# Patient Record
Sex: FEMALE | Race: WHITE | URBAN - METROPOLITAN AREA
[De-identification: names, ages, dates, MRNs, and addresses within clinical notes are randomized per-mention and may not be internally consistent; named-entity substitution may affect disease eponyms.]

---

## 2017-08-03 ENCOUNTER — TELEPHONE (OUTPATIENT)
Dept: INTERNAL MEDICINE CLINIC | Age: 42
End: 2017-08-03

## 2019-07-01 ENCOUNTER — HOSPITAL ENCOUNTER (OUTPATIENT)
Age: 44
Setting detail: SPECIMEN
Discharge: HOME | End: 2019-07-01

## 2019-07-01 PROCEDURE — 86481 TB AG RESPONSE T-CELL SUSP: CPT

## 2019-07-01 PROCEDURE — 86735 MUMPS ANTIBODY: CPT

## 2019-07-01 PROCEDURE — 86762 RUBELLA ANTIBODY: CPT

## 2019-07-01 PROCEDURE — 86787 VARICELLA-ZOSTER ANTIBODY: CPT

## 2019-07-01 PROCEDURE — 86765 RUBEOLA ANTIBODY: CPT

## 2019-07-03 LAB
MUV IGG SER QL: 155 AU/ML
MUV IGG SER QL: NORMAL AU/ML
RUBELLA ANTIBODY IGG: 25.4 IU/ML
RUBELLA ANTIBODY IGG: NORMAL IU/ML
RUBEOLA (MEASLES) AB IGG: 16.4 AU/ML
RUBEOLA (MEASLES) AB IGG: NORMAL AU/ML
VARICELLA-ZOSTER VIRUS AB, IGG: 1446 IV
VARICELLA-ZOSTER VIRUS AB, IGG: NORMAL IV

## 2019-07-07 LAB
NIL (NEGATIVE) SPOT CONTROL: NORMAL
PANEL A SPOT COUNT: 2
PANEL B SPOT COUNT: 0
POSITIVE CONTROL SPOT COUNT: NORMAL
POSITIVE CONTROL SPOT COUNT: NORMAL
TB CELL IMMUNE MEASURE: NEGATIVE
TB CELL IMMUNE MEASURE: NORMAL

## 2021-11-17 ENCOUNTER — HOSPITAL ENCOUNTER (OUTPATIENT)
Age: 46
Setting detail: SPECIMEN
Discharge: HOME | End: 2021-11-17
Payer: COMMERCIAL

## 2021-11-17 PROCEDURE — 87624 HPV HI-RISK TYP POOLED RSLT: CPT

## 2021-11-17 PROCEDURE — 88142 CYTOPATH C/V THIN LAYER: CPT

## 2021-11-22 LAB
HPV HIGH RISK: NOT DETECTED
HPV, GENOTYPE 16: NOT DETECTED
HPV, GENOTYPE 18: NOT DETECTED

## 2022-01-26 ENCOUNTER — HOSPITAL ENCOUNTER (OUTPATIENT)
Dept: MAMMOGRAPHY | Age: 47
Discharge: HOME | End: 2022-01-26
Payer: COMMERCIAL

## 2022-01-26 DIAGNOSIS — Z12.39 SCREENING BREAST EXAMINATION: ICD-10-CM

## 2022-01-26 PROCEDURE — 77063 BREAST TOMOSYNTHESIS BI: CPT

## 2022-02-11 ENCOUNTER — HOSPITAL ENCOUNTER (OUTPATIENT)
Dept: ULTRASOUND IMAGING | Age: 47
Discharge: HOME | End: 2022-02-11
Payer: COMMERCIAL

## 2022-02-11 DIAGNOSIS — R92.8 ABNORMAL MAMMOGRAM: ICD-10-CM

## 2022-02-11 PROCEDURE — 76642 ULTRASOUND BREAST LIMITED: CPT

## 2024-01-17 ENCOUNTER — HOSPITAL ENCOUNTER (OUTPATIENT)
Dept: MAMMOGRAPHY | Age: 49
Discharge: HOME | End: 2024-01-17
Payer: COMMERCIAL

## 2024-01-17 VITALS — WEIGHT: 240 LBS | HEIGHT: 61 IN | BODY MASS INDEX: 45.31 KG/M2

## 2024-01-17 DIAGNOSIS — Z12.31 SCREENING MAMMOGRAM FOR BREAST CANCER: ICD-10-CM

## 2024-01-17 PROCEDURE — 77063 BREAST TOMOSYNTHESIS BI: CPT

## 2024-05-22 RX ORDER — MAGNESIUM GLUCONATE 27 MG(500)
500 TABLET ORAL 2 TIMES DAILY
COMMUNITY

## 2024-05-22 RX ORDER — CHOLECALCIFEROL (VITAMIN D3) 1250 MCG
CAPSULE ORAL
COMMUNITY

## 2024-05-22 RX ORDER — DOXYCYCLINE HYCLATE 50 MG/1
50 CAPSULE ORAL DAILY
COMMUNITY

## 2024-05-22 RX ORDER — NORETHINDRONE ACETATE AND ETHINYL ESTRADIOL 1MG-20(21)
1 KIT ORAL DAILY
COMMUNITY

## 2024-05-22 RX ORDER — LOSARTAN POTASSIUM 100 MG/1
100 TABLET ORAL DAILY
COMMUNITY

## 2024-05-22 NOTE — PROGRESS NOTES
.Surgery @ [facility] on 5/29/24 1200 arrival 1000    NOTHING TO EAT OR DRINK AFTER MIDNIGHT DAY OF SURGERY    1. Enter thru the hospital main entrance on day of surgery, check in at the Information Desk. If you arrive prior to 6:00am, enter thru the ER entrance.    2. Follow the directions as prescribed by the doctor for your procedure and medications.         Morning of surgery take: no medications          Stop vitamins, supplements and NSAIDS:  now     3. Check with your Doctor regarding stopping blood thinners and follow their instructions.    4. Do not smoke, vape or use chewing tobacco morning of surgery. Do not drink any alcoholic beverages 24 hours prior to surgery.       This includes NA Beer. No street drugs 7 days prior to surgery.    5. If you have dentures, contacts of glasses they will be removed before going to the OR; please bring a case.    6. Please bring picture ID, insurance card, paperwork from the doctor’s office (H & P, Consent, & card for implantable devices).    7. Take a shower with an antibacterial soap the night before surgery and the morning of surgery. Do not put anything on your skin      After your morning shower.    8. You will need a responsible adult to drive you home and check on you after surgery.

## 2024-05-28 ENCOUNTER — ANESTHESIA EVENT (OUTPATIENT)
Dept: OPERATING ROOM | Age: 49
End: 2024-05-28
Payer: COMMERCIAL

## 2024-05-28 ASSESSMENT — LIFESTYLE VARIABLES: SMOKING_STATUS: 0

## 2024-05-28 NOTE — ANESTHESIA PRE PROCEDURE
Department of Anesthesiology  Preprocedure Note       Name:  Jenna Trujillo   Age:  48 y.o.  :  1975                                          MRN:  [id number]         Date:  2024      Surgeon: Surgeon(s):  Cam Jose MD    Procedure: Procedure(s):  LEFT KNEE ARTHROSCOPY MEDIAL MENISCECTOMY WITH LOOSE BODY REMOVAL    Medications prior to admission:   Prior to Admission medications    Medication Sig Start Date End Date Taking? Authorizing Provider   Cholecalciferol (VITAMIN D3) 1.25 MG (56315 UT) CAPS Take by mouth   Yes Rogelio Ackerman MD   magnesium gluconate (MAGONATE) 500 MG tablet Take 1 tablet by mouth 2 times daily   Yes Rogelio Ackerman MD   losartan (COZAAR) 100 MG tablet Take 1 tablet by mouth daily   Yes Rogelio Ackerman MD   norethindrone-ethinyl estradiol (JUNEL FE 1/20) 1-20 MG-MCG per tablet Take 1 tablet by mouth daily   Yes Rogelio Ackerman MD   doxycycline (VIBRAMYCIN) 50 MG capsule Take 1 capsule by mouth daily   Yes Rogelio Ackerman MD   norgestimate-ethinyl estradiol (SPRINTEC 28) 0.25-35 MG-MCG per tablet Take 1 tablet by mouth daily.      Rogelio Ackerman MD   UNKNOWN TO PATIENT Take 1 tablet by mouth daily. It's a birth control pill. Pt. doesn't know the name of the pill.     ProviderRogelio MD       Current medications:    No current facility-administered medications for this encounter.     Current Outpatient Medications   Medication Sig Dispense Refill   • Cholecalciferol (VITAMIN D3) 1.25 MG (16897 UT) CAPS Take by mouth     • magnesium gluconate (MAGONATE) 500 MG tablet Take 1 tablet by mouth 2 times daily     • losartan (COZAAR) 100 MG tablet Take 1 tablet by mouth daily     • norethindrone-ethinyl estradiol (JUNEL FE 1/20) 1-20 MG-MCG per tablet Take 1 tablet by mouth daily     • doxycycline (VIBRAMYCIN) 50 MG capsule Take 1 capsule by mouth daily     • norgestimate-ethinyl estradiol (SPRINTEC 28) 0.25-35 MG-MCG per tablet Take 1

## 2024-05-29 ENCOUNTER — HOSPITAL ENCOUNTER (OUTPATIENT)
Age: 49
Setting detail: OUTPATIENT SURGERY
Discharge: HOME | End: 2024-05-29
Attending: ORTHOPAEDIC SURGERY | Admitting: ORTHOPAEDIC SURGERY
Payer: COMMERCIAL

## 2024-05-29 ENCOUNTER — ANESTHESIA (OUTPATIENT)
Dept: OPERATING ROOM | Age: 49
End: 2024-05-29
Payer: COMMERCIAL

## 2024-05-29 VITALS
SYSTOLIC BLOOD PRESSURE: 111 MMHG | TEMPERATURE: 97.8 F | HEART RATE: 69 BPM | HEIGHT: 61 IN | OXYGEN SATURATION: 97 % | RESPIRATION RATE: 18 BRPM | BODY MASS INDEX: 47.2 KG/M2 | WEIGHT: 250 LBS | DIASTOLIC BLOOD PRESSURE: 70 MMHG

## 2024-05-29 DIAGNOSIS — G89.18 ACUTE POSTOPERATIVE PAIN: Primary | ICD-10-CM

## 2024-05-29 PROBLEM — S83.232A COMPLEX TEAR OF MEDIAL MENISCUS OF LEFT KNEE AS CURRENT INJURY: Status: ACTIVE | Noted: 2024-05-29

## 2024-05-29 LAB — PREGNANCY TEST URINE, POC: NEGATIVE

## 2024-05-29 PROCEDURE — 2709999900 HC NON-CHARGEABLE SUPPLY: Performed by: ORTHOPAEDIC SURGERY

## 2024-05-29 PROCEDURE — 6360000002 HC RX W HCPCS: Performed by: REGISTERED NURSE

## 2024-05-29 PROCEDURE — 81025 URINE PREGNANCY TEST: CPT

## 2024-05-29 PROCEDURE — 3600000014 HC SURGERY LEVEL 4 ADDTL 15MIN: Performed by: ORTHOPAEDIC SURGERY

## 2024-05-29 PROCEDURE — 6360000002 HC RX W HCPCS: Performed by: ANESTHESIOLOGY

## 2024-05-29 PROCEDURE — 6360000002 HC RX W HCPCS: Performed by: ORTHOPAEDIC SURGERY

## 2024-05-29 PROCEDURE — 7100000000 HC PACU RECOVERY - FIRST 15 MIN: Performed by: ORTHOPAEDIC SURGERY

## 2024-05-29 PROCEDURE — 3700000000 HC ANESTHESIA ATTENDED CARE: Performed by: ORTHOPAEDIC SURGERY

## 2024-05-29 PROCEDURE — 7100000001 HC PACU RECOVERY - ADDTL 15 MIN: Performed by: ORTHOPAEDIC SURGERY

## 2024-05-29 PROCEDURE — 2580000003 HC RX 258: Performed by: ANESTHESIOLOGY

## 2024-05-29 PROCEDURE — 3700000001 HC ADD 15 MINUTES (ANESTHESIA): Performed by: ORTHOPAEDIC SURGERY

## 2024-05-29 PROCEDURE — 7100000011 HC PHASE II RECOVERY - ADDTL 15 MIN: Performed by: ORTHOPAEDIC SURGERY

## 2024-05-29 PROCEDURE — 3600000004 HC SURGERY LEVEL 4 BASE: Performed by: ORTHOPAEDIC SURGERY

## 2024-05-29 PROCEDURE — 7100000010 HC PHASE II RECOVERY - FIRST 15 MIN: Performed by: ORTHOPAEDIC SURGERY

## 2024-05-29 RX ORDER — CLINDAMYCIN PHOSPHATE 600 MG/50ML
600 INJECTION, SOLUTION INTRAVENOUS ONCE
Status: COMPLETED | OUTPATIENT
Start: 2024-05-29 | End: 2024-05-29

## 2024-05-29 RX ORDER — OXYCODONE HYDROCHLORIDE AND ACETAMINOPHEN 5; 325 MG/1; MG/1
1-2 TABLET ORAL EVERY 6 HOURS PRN
Qty: 20 TABLET | Refills: 0 | Status: SHIPPED | OUTPATIENT
Start: 2024-05-29 | End: 2024-06-03

## 2024-05-29 RX ORDER — PROPOFOL 10 MG/ML
INJECTION, EMULSION INTRAVENOUS PRN
Status: DISCONTINUED | OUTPATIENT
Start: 2024-05-29 | End: 2024-05-29 | Stop reason: SDUPTHER

## 2024-05-29 RX ORDER — OXYCODONE HYDROCHLORIDE AND ACETAMINOPHEN 5; 325 MG/1; MG/1
1 TABLET ORAL EVERY 6 HOURS PRN
Qty: 20 TABLET | Refills: 0 | Status: SHIPPED | OUTPATIENT
Start: 2024-05-29 | End: 2024-06-03

## 2024-05-29 RX ORDER — FENTANYL CITRATE 50 UG/ML
25 INJECTION, SOLUTION INTRAMUSCULAR; INTRAVENOUS EVERY 5 MIN PRN
Status: DISCONTINUED | OUTPATIENT
Start: 2024-05-29 | End: 2024-05-29 | Stop reason: HOSPADM

## 2024-05-29 RX ORDER — ONDANSETRON 2 MG/ML
4 INJECTION INTRAMUSCULAR; INTRAVENOUS
Status: DISCONTINUED | OUTPATIENT
Start: 2024-05-29 | End: 2024-05-29 | Stop reason: HOSPADM

## 2024-05-29 RX ORDER — TRIAMCINOLONE ACETONIDE 40 MG/ML
INJECTION, SUSPENSION INTRA-ARTICULAR; INTRAMUSCULAR
Status: COMPLETED | OUTPATIENT
Start: 2024-05-29 | End: 2024-05-29

## 2024-05-29 RX ORDER — SODIUM CHLORIDE 9 MG/ML
INJECTION, SOLUTION INTRAVENOUS PRN
Status: DISCONTINUED | OUTPATIENT
Start: 2024-05-29 | End: 2024-05-29 | Stop reason: HOSPADM

## 2024-05-29 RX ORDER — LIDOCAINE HYDROCHLORIDE 20 MG/ML
INJECTION, SOLUTION INTRAVENOUS PRN
Status: DISCONTINUED | OUTPATIENT
Start: 2024-05-29 | End: 2024-05-29 | Stop reason: SDUPTHER

## 2024-05-29 RX ORDER — MIDAZOLAM HYDROCHLORIDE 1 MG/ML
INJECTION INTRAMUSCULAR; INTRAVENOUS PRN
Status: DISCONTINUED | OUTPATIENT
Start: 2024-05-29 | End: 2024-05-29 | Stop reason: SDUPTHER

## 2024-05-29 RX ORDER — SODIUM CHLORIDE 0.9 % (FLUSH) 0.9 %
5-40 SYRINGE (ML) INJECTION PRN
Status: DISCONTINUED | OUTPATIENT
Start: 2024-05-29 | End: 2024-05-29 | Stop reason: HOSPADM

## 2024-05-29 RX ORDER — LABETALOL HYDROCHLORIDE 5 MG/ML
10 INJECTION, SOLUTION INTRAVENOUS
Status: DISCONTINUED | OUTPATIENT
Start: 2024-05-29 | End: 2024-05-29 | Stop reason: HOSPADM

## 2024-05-29 RX ORDER — SODIUM CHLORIDE 0.9 % (FLUSH) 0.9 %
5-40 SYRINGE (ML) INJECTION EVERY 12 HOURS SCHEDULED
Status: DISCONTINUED | OUTPATIENT
Start: 2024-05-29 | End: 2024-05-29 | Stop reason: HOSPADM

## 2024-05-29 RX ORDER — CELECOXIB 200 MG/1
200 CAPSULE ORAL 2 TIMES DAILY
Qty: 8 CAPSULE | Refills: 0 | Status: SHIPPED | OUTPATIENT
Start: 2024-05-29 | End: 2024-06-02

## 2024-05-29 RX ORDER — DEXAMETHASONE SODIUM PHOSPHATE 4 MG/ML
INJECTION, SOLUTION INTRA-ARTICULAR; INTRALESIONAL; INTRAMUSCULAR; INTRAVENOUS; SOFT TISSUE
Status: COMPLETED | OUTPATIENT
Start: 2024-05-29 | End: 2024-05-29

## 2024-05-29 RX ORDER — SODIUM CHLORIDE, SODIUM LACTATE, POTASSIUM CHLORIDE, CALCIUM CHLORIDE 600; 310; 30; 20 MG/100ML; MG/100ML; MG/100ML; MG/100ML
INJECTION, SOLUTION INTRAVENOUS CONTINUOUS
Status: DISCONTINUED | OUTPATIENT
Start: 2024-05-29 | End: 2024-05-29 | Stop reason: HOSPADM

## 2024-05-29 RX ORDER — ONDANSETRON 2 MG/ML
INJECTION INTRAMUSCULAR; INTRAVENOUS PRN
Status: DISCONTINUED | OUTPATIENT
Start: 2024-05-29 | End: 2024-05-29 | Stop reason: SDUPTHER

## 2024-05-29 RX ORDER — DEXAMETHASONE SODIUM PHOSPHATE 4 MG/ML
INJECTION, SOLUTION INTRA-ARTICULAR; INTRALESIONAL; INTRAMUSCULAR; INTRAVENOUS; SOFT TISSUE PRN
Status: DISCONTINUED | OUTPATIENT
Start: 2024-05-29 | End: 2024-05-29 | Stop reason: SDUPTHER

## 2024-05-29 RX ORDER — HYDRALAZINE HYDROCHLORIDE 20 MG/ML
10 INJECTION INTRAMUSCULAR; INTRAVENOUS
Status: DISCONTINUED | OUTPATIENT
Start: 2024-05-29 | End: 2024-05-29 | Stop reason: HOSPADM

## 2024-05-29 RX ORDER — ONDANSETRON 4 MG/1
4 TABLET, FILM COATED ORAL 3 TIMES DAILY PRN
Qty: 10 TABLET | Refills: 0 | Status: SHIPPED | OUTPATIENT
Start: 2024-05-29

## 2024-05-29 RX ORDER — FENTANYL CITRATE 50 UG/ML
50 INJECTION, SOLUTION INTRAMUSCULAR; INTRAVENOUS EVERY 5 MIN PRN
Status: DISCONTINUED | OUTPATIENT
Start: 2024-05-29 | End: 2024-05-29 | Stop reason: HOSPADM

## 2024-05-29 RX ORDER — NALOXONE HYDROCHLORIDE 0.4 MG/ML
INJECTION, SOLUTION INTRAMUSCULAR; INTRAVENOUS; SUBCUTANEOUS PRN
Status: DISCONTINUED | OUTPATIENT
Start: 2024-05-29 | End: 2024-05-29 | Stop reason: HOSPADM

## 2024-05-29 RX ORDER — BUPIVACAINE HYDROCHLORIDE 2.5 MG/ML
INJECTION, SOLUTION EPIDURAL; INFILTRATION; INTRACAUDAL
Status: COMPLETED | OUTPATIENT
Start: 2024-05-29 | End: 2024-05-29

## 2024-05-29 RX ORDER — FENTANYL CITRATE 50 UG/ML
INJECTION, SOLUTION INTRAMUSCULAR; INTRAVENOUS PRN
Status: DISCONTINUED | OUTPATIENT
Start: 2024-05-29 | End: 2024-05-29 | Stop reason: SDUPTHER

## 2024-05-29 RX ADMIN — PROPOFOL 180 MG: 10 INJECTION, EMULSION INTRAVENOUS at 12:17

## 2024-05-29 RX ADMIN — MIDAZOLAM 2 MG: 1 INJECTION INTRAMUSCULAR; INTRAVENOUS at 12:09

## 2024-05-29 RX ADMIN — PROPOFOL 20 MG: 10 INJECTION, EMULSION INTRAVENOUS at 12:45

## 2024-05-29 RX ADMIN — CLINDAMYCIN PHOSPHATE 600 MG: 600 INJECTION, SOLUTION INTRAVENOUS at 12:39

## 2024-05-29 RX ADMIN — FENTANYL CITRATE 50 MCG: 50 INJECTION, SOLUTION INTRAMUSCULAR; INTRAVENOUS at 13:27

## 2024-05-29 RX ADMIN — LIDOCAINE HYDROCHLORIDE 100 MG: 20 INJECTION, SOLUTION INTRAVENOUS at 12:17

## 2024-05-29 RX ADMIN — ONDANSETRON 4 MG: 2 INJECTION INTRAMUSCULAR; INTRAVENOUS at 12:58

## 2024-05-29 RX ADMIN — SODIUM CHLORIDE, POTASSIUM CHLORIDE, SODIUM LACTATE AND CALCIUM CHLORIDE: 600; 310; 30; 20 INJECTION, SOLUTION INTRAVENOUS at 12:11

## 2024-05-29 RX ADMIN — DEXAMETHASONE SODIUM PHOSPHATE 8 MG: 4 INJECTION, SOLUTION INTRAMUSCULAR; INTRAVENOUS at 12:21

## 2024-05-29 RX ADMIN — FENTANYL CITRATE 50 MCG: 50 INJECTION, SOLUTION INTRAMUSCULAR; INTRAVENOUS at 12:21

## 2024-05-29 RX ADMIN — FENTANYL CITRATE 50 MCG: 50 INJECTION, SOLUTION INTRAMUSCULAR; INTRAVENOUS at 12:45

## 2024-05-29 ASSESSMENT — PAIN DESCRIPTION - DESCRIPTORS
DESCRIPTORS: DULL;ACHING
DESCRIPTORS: CRAMPING;BURNING;DISCOMFORT

## 2024-05-29 ASSESSMENT — PAIN - FUNCTIONAL ASSESSMENT
PAIN_FUNCTIONAL_ASSESSMENT: 0-10
PAIN_FUNCTIONAL_ASSESSMENT: 0-10
PAIN_FUNCTIONAL_ASSESSMENT: PREVENTS OR INTERFERES SOME ACTIVE ACTIVITIES AND ADLS
PAIN_FUNCTIONAL_ASSESSMENT: 0-10
PAIN_FUNCTIONAL_ASSESSMENT: PREVENTS OR INTERFERES SOME ACTIVE ACTIVITIES AND ADLS

## 2024-05-29 ASSESSMENT — PAIN SCALES - GENERAL: PAINLEVEL_OUTOF10: 7

## 2024-05-29 ASSESSMENT — PAIN DESCRIPTION - ORIENTATION: ORIENTATION: LEFT

## 2024-05-29 ASSESSMENT — PAIN DESCRIPTION - LOCATION: LOCATION: KNEE

## 2024-05-29 NOTE — H&P
MD Rogelio   losartan (COZAAR) 100 MG tablet Take 1 tablet by mouth daily   Yes ProviderRogelio MD   norethindrone-ethinyl estradiol (JUNEL FE 1/20) 1-20 MG-MCG per tablet Take 1 tablet by mouth daily   Yes Rogelio Ackerman MD   doxycycline (VIBRAMYCIN) 50 MG capsule Take 1 capsule by mouth daily   Yes Rogelio Ackerman MD   norgestimate-ethinyl estradiol (SPRINTEC 28) 0.25-35 MG-MCG per tablet Take 1 tablet by mouth daily.      Rogelio Ackerman MD   UNKNOWN TO PATIENT Take 1 tablet by mouth daily. It's a birth control pill. Pt. doesn't know the name of the pill.     Rogelio Ackerman MD       Allergies     Cefaclor    Social History   TOBACCO:   reports that she has never smoked. She has never used smokeless tobacco.  ETOH:   reports current alcohol use.      Family History         Problem Relation Age of Onset    Cancer Mother         breast lumpectomy    Breast Cancer Mother     Other Father         sleep apnea    Cancer Father         kidney (1 kidney removed)    Early Death Sister         Killed in MVA    Breast Cancer Maternal Grandmother     Ovarian Cancer Neg Hx        Review of Systems   Constitutional:  No weight loss, no night sweats  Eyes:  No visual changes  ENT:  No nasal drainage or ear pain  CV:  No chest pain  PULM:  No cough, no shortness of breath  GI:  No nausea, vomiting, diarrhea  :  No dysuria  Musc:  No weakness of arms or legs  Neuro: No numbness, tingling or parethesias  Skin:  No rashes  Psych: No depression symptoms    Physical Exam:    Vitals: Ht 1.549 m (5' 1\")   Wt 113.4 kg (250 lb)   LMP  (LMP Unknown)   BMI 47.24 kg/m² ,  Body mass index is 47.24 kg/m².   General appearance: alert, appears stated age and cooperative  Skin: Skin color, texture, turgor normal. No rashes or lesions  HEENT: Head: Normocephalic, no lesions, without obvious abnormality.  Neck: no adenopathy, no carotid bruit, no JVD, and supple, symmetrical, trachea midline  Lungs: clear to

## 2024-05-29 NOTE — PROGRESS NOTES
1307: Patient arrived in PACU from the OR s/p left knee arthroscopy. Received report from Beryl JACKSON and Wanda CALDERON. Patient attached to monitor and all alarms are on.  1327: Patient c/o pain see mar.

## 2024-05-29 NOTE — BRIEF OP NOTE
Department of Orthopedic Surgery  Brief Operative Report                                                                                                      OPERATIVE NOTE    Patient name  Jenna Trujillo   Date of Birth   1975  MRN   [id number]   Date of Procedure 5/29/2024      Pre-operative Diagnosis:   Left knee medial meniscus tear with chondromalacia with loose bodies    Post-operative Diagnosis: Same    Procedure:    Left knee arthroscopy (see OP note for full details)    Surgeon:     Cam Jose MD    Assistant   None     Anesthesia:     General endotracheal    Estimated blood loss:   Less than 10cc    Specimens:     None    Complications:   None    Condition:     Stable      See dictated operative report for full details. Patient tolerated the procedure well and returned to recovery in stable condition.  Postoperative orders and instructions have been provided.    Cam Jose MD, MD

## 2024-05-29 NOTE — PROGRESS NOTES
1403 recd pt from pacu, report taken from MANUEL Stanley.  Monitors applied, alarms on, vss, pt is alert and awake, call light in reach, , Perry, to bedside, pt given ice water 1436 vss,  at bedside, call light in reach, pt rates pain 3/10, tolerable.  1455 iv removed, d/c instructions given to patient and , pt getting dressed for d/c,  getting car for d/c.  1500 pt d/c to home via w/c per nurse.  Pt stable at d/c

## 2024-05-29 NOTE — DISCHARGE INSTRUCTIONS
[facility]  [phone]    Do not drive, work around machines or use equipment.  Do not drink any alcoholic beverages.  Do not smoke while alone.  Avoid making important decisions.  Plan to spend a quiet, relaxed evening @ home.  Resume normal activities as you begin to feel better.  Eat lightly for your first meal, then gradually increase your diet to what is normal for you.  In case of nausea, avoid food and drink only clear liquids.  Resume food as nausea ceases.  Notify your surgeon if you experience fever, chills, large amount of bleeding, difficulty breathing, persistent nausea and vomiting or any other disturbing problem.  Call for a follow-up appointment with your surgeon.

## 2024-05-29 NOTE — OP NOTE
[address] [place] [address]                            OPERATIVE REPORT      PATIENT NAME: GILMA ABREU                : 1975  MED REC NO: [id number]                      ROOM: OR  ACCOUNT NO: [id number]                       ADMIT DATE: 2024  PROVIDER: Cam Jose MD      DATE OF PROCEDURE:  2024    SURGEON:  Cam Jose MD    PREOPERATIVE DIAGNOSIS:  Left knee medial meniscus tear with loose bodies.    POSTOPERATIVE DIAGNOSES:    1. Left knee medial meniscus tear.  2. Intraarticular loose bodies-multiple.  3. 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  4. Grade 2 chondromalacia of patellofemoral joint.    PROCEDURES PERFORMED:    1. Left knee arthroscopy with partial medial meniscectomy.  2. Intraarticular loose body removal.  3. Chondroplasty.    ASSISTANTS:  None.    ANESTHESIA:  General endotracheal.    ESTIMATED BLOOD LOSS:  Minimal.    DRAINS:  None.    SPECIMENS:  None.    COMPLICATIONS:  None.    INDICATIONS FOR PROCEDURE:  The patient is a 48-year-old female who has continued ongoing pain with regard to her left knee.  She failed conservative treatment.  Wished to proceed with operative intervention.  She was explained risks and benefits of surgical intervention as outlined in the office notes.    DESCRIPTION OF PROCEDURE:  The patient was brought back to the operating room and placed supine on the operating table.    A tourniquet was placed on left proximal thigh.  Left lower extremity was then prepped and draped in usual manner.  Time-out was then performed to confirm the patient's name, operative site, proposed procedure, known allergies, as well as administration of antibiotics.  Once all were in agreement, procedure was started.  First, the limb was elevated and tourniquet was inflated to 300 mmHg.  Anterolateral and anteromedial portals were

## 2024-06-01 NOTE — ANESTHESIA POSTPROCEDURE EVALUATION
Department of Anesthesiology  Postprocedure Note    Patient: Jenna Trujillo  MRN: [id number]  YOB: 1975  Date of evaluation: 6/1/2024    Procedure Summary       Date: 05/29/24 Room / Location: [address]    Anesthesia Start: 1211 Anesthesia Stop: 1310    Procedure: LEFT KNEE ARTHROSCOPY MEDIAL MENISCECTOMY WITH LOOSE BODY REMOVAL (Left) Diagnosis:       Complex tear of medial meniscus of left knee as current injury, initial encounter      Loose body of left knee      (Complex tear of medial meniscus of left knee as current injury, initial encounter [S83.232A])      (Loose body of left knee [M23.42])    Surgeons: Cam Jose MD Responsible Provider: Vadim Lovelace MD    Anesthesia Type: general ASA Status: 3            Anesthesia Type: No value filed.    Sarah Phase I: Sarah Score: 10    Sarah Phase II: Sarah Score: 10    Anesthesia Post Evaluation    Patient location during evaluation: PACU  Patient participation: complete - patient participated  Level of consciousness: sleepy but conscious  Pain score: 2  Airway patency: patent  Nausea & Vomiting: no nausea and no vomiting  Cardiovascular status: hemodynamically stable  Respiratory status: acceptable  Hydration status: euvolemic  Pain management: adequate    No notable events documented.

## 2024-07-18 ENCOUNTER — HOSPITAL ENCOUNTER (OUTPATIENT)
Dept: PHYSICAL THERAPY | Age: 49
Setting detail: THERAPIES SERIES
Discharge: HOME | End: 2024-07-18
Payer: COMMERCIAL

## 2024-07-18 PROCEDURE — 97161 PT EVAL LOW COMPLEX 20 MIN: CPT

## 2024-07-18 PROCEDURE — 97110 THERAPEUTIC EXERCISES: CPT

## 2024-07-18 ASSESSMENT — PAIN SCALES - GENERAL: PAINLEVEL_OUTOF10: 0

## 2024-07-18 ASSESSMENT — PAIN DESCRIPTION - DESCRIPTORS: DESCRIPTORS: ACHING;THROBBING;DULL

## 2024-07-18 ASSESSMENT — PAIN DESCRIPTION - PAIN TYPE: TYPE: SURGICAL PAIN

## 2024-07-18 ASSESSMENT — PAIN DESCRIPTION - LOCATION: LOCATION: KNEE

## 2024-07-18 ASSESSMENT — PAIN DESCRIPTION - ORIENTATION: ORIENTATION: LEFT;ANTERIOR

## 2024-07-18 NOTE — PROGRESS NOTES
return to [facility] PHYSICAL [facility].  Please fax to the location listed below. THANK YOU for this referral!    [facility]  [address]  [facility] [address]  Dept: [phone]  Dept Fax: [phone]  Loc: [phone]       POC NOTE

## 2024-07-18 NOTE — FLOWSHEET NOTE
Outpatient Physical Therapy  [place]           [x] Phone: [phone]   Fax: [phone]  [place]           [] Phone: [phone]   Fax: [phone]        Physical Therapy Daily Treatment Note  Date:  2024    Patient Name:  Jenna Trujillo    :  1975  MRN: [id number]  Restrictions/Precautions: No data recorded      Diagnosis:   Complex tear of medial meniscus, current injury, left knee, initial encounter [S83.232A]  Loose body in knee, left knee [M23.42]  Encounter for other specified surgical aftercare [Z48.89]   Date of Injury/Surgery: L knee meniscectomy on 24  Treatment Diagnosis:  L knee weakness, decreased range of motion, balance deficits, and pain  Insurance/Certification information: [employer], 30 visits pcy  Referring Physician:  Karri Chery PA-C   PCP: Cici Sanabria PA-C  Next Doctor Visit: N/A  Plan of care signed (Y/N): N, sent 24  Outcome Measure: LEFS: 58/80  Visit# / total visits:   1/5 per POC  Pain level:  2/10   Goals:     Patient goals: Improve balance and strength  Short term goals  Time Frame for Short term goals: Defer to long term goals    Long Term Goals: 6 weeks   Long Term Goal 1: Pt will demo I with HEP/symptom management.   Long Term Goal 2: Pt will demo normal gait mechanics with min/no deficits to ease community mobility.   Long Term Goal 3: Pt will demo 0-130 deg knee A/PROM to ease transfers.   Long Term Goal 4: Pt will complete B SLS for >20 seconds to demonstrate improved balance and stability.   Long Term Goal 5: Pt will demo >25% improvement per LEFS to demo improved functional mobility.   Long Term Goal 6: Pt will demo 5x sit to stand <15 sec and without pain to demo improved LE strength and ease with transfers.        Summary of Evaluation: Pt is a 49 year old female presenting s/p L knee meniscectomy on 24. Pt now has difficulties completing work related activities, prolonged sitting/standing, walking, and transfers. Pt demo

## 2024-07-18 NOTE — PLAN OF CARE
[facility]  [facility] [place] [facility]  [address]  [facility] [address]  Dept: [phone]  Dept Fax: [phone]  Loc: [phone]    PHYSICAL THERAPY PLAN OF CARE: INITIAL EVALUATION    Patient: Jenna Trujillo (49 y.o. female)   Examination Date: 2024  Plan of Care Certification Period: 2024 to        :  1975  MRN: [id number]  CSN: [id number]   Insurance: Payor: [facility] / Plan: [employer] [facility] [employer] / Product Type: *No Product type* /   Insurance ID: [id number] - (Commercial) Secondary Insurance (if applicable):    Referring Physician: Karri Chery PA-C     PCP: Cici Sanabria PA-C Visits to Date/Visits Approved:   /      No Show/Cancelled Appts:   /       Medical Diagnosis: Complex tear of medial meniscus, current injury, left knee, initial encounter [S83.232A]  Loose body in knee, left knee [M23.42]  Encounter for other specified surgical aftercare [Z48.89]    Treatment Diagnosis: L knee weakness, decreased range of motion, balance deficits, and pain       ASSESSMENT      Impression: Pt is a 49 year old female presenting s/p L knee meniscectomy on 24. Pt now has difficulties completing work related activities, prolonged sitting/standing, walking, and transfers. Pt demo deficits this date that include L knee weakness, decreased range of motion, pain, and balance/gait deficits. Pt will benefit with PT services with progression of strength/ROM, manual and modalities to return to PLOF. Pt prior to onset of current condition had min/no pain with able to complete full ADLs and work activities. Patient received education on their current pathology and how their condition effects them with their functional activities. Patient understood discussion and questions were answered. Patient understands their activity limitations and understands rational for treatment progression.      Body Structures, Functions, Activity Limitations Requiring Skilled

## 2024-07-23 ENCOUNTER — HOSPITAL ENCOUNTER (OUTPATIENT)
Dept: PHYSICAL THERAPY | Age: 49
Setting detail: THERAPIES SERIES
Discharge: HOME | End: 2024-07-23
Payer: COMMERCIAL

## 2024-07-23 PROCEDURE — 97112 NEUROMUSCULAR REEDUCATION: CPT

## 2024-07-23 PROCEDURE — 97110 THERAPEUTIC EXERCISES: CPT

## 2024-07-23 NOTE — FLOWSHEET NOTE
progression.       Home Exercise Program:  *HO issued, reviewed and discussed with patient. All questions answered. Pt agreed to comply.    Reviewed HEP and added SLR and bridging    Manual Treatments:  --      Modalities:  --      Communication with other providers:  POC sent 7/18/24      Assessment:  (Response towards treatment session) (Pain Rating) Pt demonstrated overall good tolerance without increasing pain symptoms. Doing well with exercises. Will continue with therapy progressing as tolerated. Is to return to work Monday July 29.  End pain 0/10 , reports of less stiffness    Pt is a 49 year old female presenting s/p L knee meniscectomy on 5/29/24. Pt now has difficulties completing work related activities, prolonged sitting/standing, walking, and transfers. Pt demo deficits this date that include L knee weakness, decreased range of motion, pain, and balance/gait deficits. Pt will benefit with PT services with progression of strength/ROM, manual and modalities to return to PLOF. Pt prior to onset of current condition had min/no pain with able to complete full ADLs and work activities. Patient received education on their current pathology and how their condition effects them with their functional activities. Patient understood discussion and questions were answered. Patient understands their activity limitations and understands rational for treatment progression.       Plan for Next Session: Progress L LE strength, range of motion, balance, and return to work activities      Time In / Time Out:  1504/1548       Timed Code/Total Treatment Minutes:  44/44, (2) TE, (1) NR      Next Progress Note due:  Eval on 7/18/24. Visit 10.      Plan of Care Interventions:  [x] Therapeutic Exercise  [x] Modalities:  [x] Therapeutic Activity     [] Ultrasound  [] Estim  [x] Gait Training      [] Cervical Traction [] Lumbar Traction  [x] Neuromuscular Re-education    [x] Cold/hotpack [] Iontophoresis   [x] Instruction in HEP

## 2024-07-25 ENCOUNTER — HOSPITAL ENCOUNTER (OUTPATIENT)
Dept: PHYSICAL THERAPY | Age: 49
Setting detail: THERAPIES SERIES
Discharge: HOME | End: 2024-07-25
Payer: COMMERCIAL

## 2024-07-25 PROCEDURE — 97112 NEUROMUSCULAR REEDUCATION: CPT

## 2024-07-25 PROCEDURE — 97110 THERAPEUTIC EXERCISES: CPT

## 2024-07-25 NOTE — FLOWSHEET NOTE
Outpatient Physical Therapy  [place]           [x] Phone: [phone]   Fax: [phone]  [place]           [] Phone: [phone]   Fax: [phone]        Physical Therapy Daily Treatment Note  Date:  2024    Patient Name:  Jenna Trujillo    :  1975  MRN: [id number]  Restrictions/Precautions: No data recorded      Diagnosis:   Complex tear of medial meniscus, current injury, left knee, initial encounter [S83.232A]  Loose body in knee, left knee [M23.42]  Encounter for other specified surgical aftercare [Z48.89]   Date of Injury/Surgery: L knee meniscectomy on 24  Treatment Diagnosis:  L knee weakness, decreased range of motion, balance deficits, and pain  Insurance/Certification information: [employer], 30 visits pcy  Referring Physician:  Karri Chery PA-C   PCP: Cici Sanabria PA-C  Next Doctor Visit: N/A  Plan of care signed (Y/N): N, sent 24  Outcome Measure: LEFS: 58/80  Visit# / total visits:   3/5 per POC  Pain level:  2/10 \"achy\"   Goals:     Patient goals: Improve balance and strength  Short term goals  Time Frame for Short term goals: Defer to long term goals    Long Term Goals: 6 weeks   Long Term Goal 1: Pt will demo I with HEP/symptom management.   Long Term Goal 2: Pt will demo normal gait mechanics with min/no deficits to ease community mobility.   Long Term Goal 3: Pt will demo 0-130 deg knee A/PROM to ease transfers.   Long Term Goal 4: Pt will complete B SLS for >20 seconds to demonstrate improved balance and stability.   Long Term Goal 5: Pt will demo >25% improvement per LEFS to demo improved functional mobility.   Long Term Goal 6: Pt will demo 5x sit to stand <15 sec and without pain to demo improved LE strength and ease with transfers.        Summary of Evaluation: Pt is a 49 year old female presenting s/p L knee meniscectomy on 24. Pt now has difficulties completing work related activities, prolonged sitting/standing, walking, and transfers. Pt

## 2024-08-01 ENCOUNTER — HOSPITAL ENCOUNTER (OUTPATIENT)
Dept: PHYSICAL THERAPY | Age: 49
Setting detail: THERAPIES SERIES
Discharge: HOME | End: 2024-08-01
Payer: COMMERCIAL

## 2024-08-01 PROCEDURE — 97112 NEUROMUSCULAR REEDUCATION: CPT

## 2024-08-01 PROCEDURE — 97110 THERAPEUTIC EXERCISES: CPT

## 2024-08-01 NOTE — FLOWSHEET NOTE
Outpatient Physical Therapy  [place]           [x] Phone: [phone]   Fax: [phone]  [place]           [] Phone: [phone]   Fax: [phone]        Physical Therapy Daily Treatment Note  Date:  2024    Patient Name:  Jenna Trujillo    :  1975  MRN: [id number]  Restrictions/Precautions: No data recorded      Diagnosis:   Complex tear of medial meniscus, current injury, left knee, initial encounter [S83.232A]  Loose body in knee, left knee [M23.42]  Encounter for other specified surgical aftercare [Z48.89]   Date of Injury/Surgery: L knee meniscectomy on 24  Treatment Diagnosis:  L knee weakness, decreased range of motion, balance deficits, and pain  Insurance/Certification information: [employer], 30 visits pcy  Referring Physician:  Karri Chery PA-C   PCP: Cici Sanabria PA-C  Next Doctor Visit: N/A  Plan of care signed (Y/N): N, sent 24  Outcome Measure: LEFS: 58/80  Visit# / total visits:   4/5 per POC  Pain level:  2-3/10   Goals:     Patient goals: Improve balance and strength  Short term goals  Time Frame for Short term goals: Defer to long term goals    Long Term Goals: 6 weeks   Long Term Goal 1: Pt will demo I with HEP/symptom management.   Long Term Goal 2: Pt will demo normal gait mechanics with min/no deficits to ease community mobility.   Long Term Goal 3: Pt will demo 0-130 deg knee A/PROM to ease transfers.   Long Term Goal 4: Pt will complete B SLS for >20 seconds to demonstrate improved balance and stability.   Long Term Goal 5: Pt will demo >25% improvement per LEFS to demo improved functional mobility.   Long Term Goal 6: Pt will demo 5x sit to stand <15 sec and without pain to demo improved LE strength and ease with transfers.        Summary of Evaluation: Pt is a 49 year old female presenting s/p L knee meniscectomy on 24. Pt now has difficulties completing work related activities, prolonged sitting/standing, walking, and transfers. Pt demo

## 2024-08-08 ENCOUNTER — HOSPITAL ENCOUNTER (OUTPATIENT)
Dept: PHYSICAL THERAPY | Age: 49
Setting detail: THERAPIES SERIES
Discharge: HOME | End: 2024-08-08
Payer: COMMERCIAL

## 2024-08-08 PROCEDURE — 97110 THERAPEUTIC EXERCISES: CPT

## 2024-08-08 PROCEDURE — 97530 THERAPEUTIC ACTIVITIES: CPT

## 2024-08-08 NOTE — FLOWSHEET NOTE
Outpatient Physical Therapy  [place]           [x] Phone: [phone]   Fax: [phone]  [place]           [] Phone: [phone]   Fax: [phone]        Physical Therapy Daily Treatment Note  Date:  2024    Patient Name:  Jenna Trujillo    :  1975  MRN: [id number]  Restrictions/Precautions: None   Diagnosis:   Complex tear of medial meniscus, current injury, left knee, initial encounter [S83.232A]  Loose body in knee, left knee [M23.42]  Encounter for other specified surgical aftercare [Z48.89]   Date of Injury/Surgery: L knee meniscectomy on 24  Treatment Diagnosis:  L knee weakness, decreased range of motion, balance deficits, and pain  Insurance/Certification information: [employer], 30 visits pcy  Referring Physician:  Karri Chery PA-C   PCP: Cici Sanabria PA-C  Next Doctor Visit: N/A  Plan of care signed (Y/N): N, sent 24  Outcome Measure: LEFS: 58/80  Visit# / total visits:   5/5 per POC  Pain level:  1-2/10   Goals:     Patient goals: Improve balance and strength  Short term goals  Time Frame for Short term goals: Defer to long term goals    Long Term Goals: 6 weeks   Long Term Goal 1: Pt will demo I with HEP/symptom management. Progressing  Long Term Goal 2: Pt will demo normal gait mechanics with min/no deficits to ease community mobility. MET  Long Term Goal 3: Pt will demo 0-130 deg knee A/PROM to ease transfers. Progressing  Long Term Goal 4: Pt will complete B SLS for >20 seconds to demonstrate improved balance and stability. MET  Long Term Goal 5: Pt will demo >25% improvement per LEFS to demo improved functional mobility. Not MET  Long Term Goal 6: Pt will demo 5x sit to stand <15 sec and without pain to demo improved LE strength and ease with transfers. Partially MET      Summary of Evaluation: Pt is a 49 year old female presenting s/p L knee meniscectomy on 24. Pt now has difficulties completing work related activities, prolonged sitting/standing,

## 2024-08-08 NOTE — PROGRESS NOTES
Outpatient Physical Therapy           [facility]           [x] Phone: [phone]   Fax: [phone]  [place]           [] Phone: [phone]   Fax: [phone]      To: Karri Chery PA-C   From: John Sam PT, DPT, OCS    Patient: Jenna Trujillo                    : 1975  Diagnosis:  Complex tear of medial meniscus, current injury, left knee, initial encounter [S83.232A]  Loose body in knee, left knee [M23.42]  Encounter for other specified surgical aftercare [Z48.89]       Treatment Diagnosis: L knee weakness, decreased range of motion, balance deficits, and pain   Date: 2024  [x]  Progress Note                []  Discharge Note    Evaluation Date: 24   Total Visits to date:   5 Cancels/No-shows to date:  0    Subjective: Pt reports 1-2/10 pain upon arrival and states she hasn't had the opportunity to perform HEP for last couple of days due to life getting busy, although she does notice an improvement in pain and function when she does perform them consistently. Pt has been sleeping better for the last few nights with the use of pillows underneath B legs.      Plan of Care/Treatment to date:  [x] Therapeutic Exercise    [x] Modalities:  [x] Therapeutic Activity     [] Ultrasound  [] Electrical Stimulation  [x] Gait Training      [] Cervical Traction   [] Lumbar Traction  [x] Neuromuscular Re-education  [x] Cold/hotpack [] Iontophoresis  [x] Instruction in HEP      Other:  [x] Manual Therapy       [x]  Vasopneumatic  [] Aquatic Therapy       []   Dry Needle Therapy                      Objective/Significant Findings At Last Visit/Comments:    Mod sway with airex cone taps; utilized ankle/hip strategies with intermittent use of UEs to correct     5xSTS: 12.87 seconds without UE assistance and with pain (3/10)     Right Knee Palpation: No tenderness to note around incisions/knee     Balance  Tandem Stance R Le seconds  Tandem Stance L Le seconds  Single Stance R Le

## 2024-08-22 ENCOUNTER — HOSPITAL ENCOUNTER (OUTPATIENT)
Dept: PHYSICAL THERAPY | Age: 49
Setting detail: THERAPIES SERIES
Discharge: HOME | End: 2024-08-22
Payer: COMMERCIAL

## 2024-08-22 PROCEDURE — 97140 MANUAL THERAPY 1/> REGIONS: CPT

## 2024-08-22 PROCEDURE — 97110 THERAPEUTIC EXERCISES: CPT

## 2024-08-22 NOTE — FLOWSHEET NOTE
walking, and transfers. Pt demo deficits this date that include L knee weakness, decreased range of motion, pain, and balance/gait deficits. Pt will benefit with PT services with progression of strength/ROM, manual and modalities to return to PLOF. Pt prior to onset of current condition had min/no pain with able to complete full ADLs and work activities. Patient received education on their current pathology and how their condition effects them with their functional activities. Patient understood discussion and questions were answered. Patient understands their activity limitations and understands rational for treatment progression.       Subjective: Pt reports pain as stiffness. Reports biggest limiting factor is full knee extension. Feels her gait is off, getting some right hip pain. Wants to buy a leg press for home it feels good.      Any changes in Ambulatory Summary Sheet?  None        Objective:    Mod sway with airex cone taps; utilized ankle/hip strategies with intermittent use of UEs to correct    5xSTS: 12.87 seconds without UE assistance and with pain (3/10)     Right Knee Palpation: No tenderness to note around incisions/knee     Balance  Tandem Stance R Le seconds  Tandem Stance L Le seconds  Single Stance R Le seconds  Single Stance L Le seconds  Romberg/Narrow Base of Support  Eyes Open: 20 seconds  Sway: None  Eyes Closed: 20 seconds  Sway: Minimal     Left AROM  Right AROM      AROM LLE (degrees)  L Knee Flexion (0-145): 125 degrees with pain (2/10)  L Knee Extension (0): lacking 5 degrees AROM RLE (degrees)  R Knee Flexion (0-145): 132 degrees  R Knee Extension (0): 2 degrees hyperextension      Left Strength  Right Strength      General Strength Testing LE: Right WFL  Strength LLE  L Hip Flexion: 5/5  L Hip ABduction: 5/5  L Hip ADduction: 5/5  L Hip Internal Rotation: 5/5  L Hip External Rotation: 5/5  L Knee Flexion: 5/5  L Knee Extension: 5/5 General Strength Testing LE:

## 2024-08-28 ENCOUNTER — HOSPITAL ENCOUNTER (OUTPATIENT)
Dept: PHYSICAL THERAPY | Age: 49
Setting detail: THERAPIES SERIES
Discharge: HOME | End: 2024-08-28
Payer: COMMERCIAL

## 2024-08-28 PROCEDURE — 97140 MANUAL THERAPY 1/> REGIONS: CPT

## 2024-08-28 PROCEDURE — 97112 NEUROMUSCULAR REEDUCATION: CPT

## 2024-08-28 PROCEDURE — 97110 THERAPEUTIC EXERCISES: CPT

## 2024-08-28 NOTE — FLOWSHEET NOTE
Outpatient Physical Therapy  [facility]           [x] Phone: [phone]   Fax: [phone]  [place]           [] Phone: [phone]   Fax: [phone]        Physical Therapy Daily Treatment Note  Date:  2024    Patient Name:  Jenna Trujillo    :  1975  MRN: [id number]  Restrictions/Precautions: None   Diagnosis:   Complex tear of medial meniscus, current injury, left knee, initial encounter [S83.232A]  Loose body in knee, left knee [M23.42]  Encounter for other specified surgical aftercare [Z48.89]   Date of Injury/Surgery: L knee meniscectomy on 24  Treatment Diagnosis:  L knee weakness, decreased range of motion, balance deficits, and pain  Insurance/Certification information: [employer], 30 visits pcy  Referring Physician:  Karri Chery PA-C   PCP: Cici Sanabria PA-C  Next Doctor Visit: N/A  Plan of care signed (Y/N): N, sent 24  Outcome Measure: LEFS: 58/80    Visit# / total visits:  7/10 per POC    Pain level:   0 /10     Goals:     Patient goals: Improve balance and strength  Short term goals  Time Frame for Short term goals: Defer to long term goals    Long Term Goals: 6 weeks   Long Term Goal 1: Pt will demo I with HEP/symptom management. Progressing  Long Term Goal 2: Pt will demo normal gait mechanics with min/no deficits to ease community mobility. MET  Long Term Goal 3: Pt will demo 0-130 deg knee A/PROM to ease transfers. Progressing  Long Term Goal 4: Pt will complete B SLS for >20 seconds to demonstrate improved balance and stability. MET  Long Term Goal 5: Pt will demo >25% improvement per LEFS to demo improved functional mobility. Not MET  Long Term Goal 6: Pt will demo 5x sit to stand <15 sec and without pain to demo improved LE strength and ease with transfers. Partially MET      Summary of Evaluation: Pt is a 49 year old female presenting s/p L knee meniscectomy on 24. Pt now has difficulties completing work related activities, prolonged  sitting/standing, walking, and transfers. Pt demo deficits this date that include L knee weakness, decreased range of motion, pain, and balance/gait deficits. Pt will benefit with PT services with progression of strength/ROM, manual and modalities to return to PLOF. Pt prior to onset of current condition had min/no pain with able to complete full ADLs and work activities. Patient received education on their current pathology and how their condition effects them with their functional activities. Patient understood discussion and questions were answered. Patient understands their activity limitations and understands rational for treatment progression.       Subjective: Pt reports less stiffness. Reports with improving knee extension due to stiffness. Overall feeling good after work shifts. Feels 80% max function at this time. No longer seeing surgeon. Doing well at home.       Any changes in Ambulatory Summary Sheet?  None        Objective:      Normal gait mechanics  Able to reach full TKE with min sensation   Good technique with exercises   Prasanth to stand staggered with L foot back.        Exercises: (No more than 4 columns)   Exercise/Equipment 8/1/24 #4 8/8/24 #5 8-22-24 8/28/24   #7            WARM UP       NuStep seat #9 Lv5 5' Lv5 5' L5 x 5 Lv5  5'           TABLE       SL bridge 2x10 through 1/2 of full range      Resisted/weighted SLR 5# 2x10      Heel prop 2x1' with 10# weight   2x1'   10#                     STANDING       *Resisted marches       *Resisted lateral walks       *Wall sits       *3-way balance on L LE       Sled push/pulls Sled plus 50#, 2 laps      Shuttle presses L LE 1.5 cord 2x10 L LE 2 cords 2x10 L LE 3 cords   3x10    Cybex knee ext/flex seat 6  Knee ext: #7  Knee flex 10 L LE 22# 2x10 each dir L LE 22# 2x10 each dir LLE only,  22# 2 x 10 ea LLE only,  22# 2 x 10 ea  2\"   Sport arc leg press seat 6   33# x 20    Standing hamstring stretch   X 1 min ea on step     Standing gastroc.   X 1

## 2024-09-05 ENCOUNTER — HOSPITAL ENCOUNTER (OUTPATIENT)
Dept: PHYSICAL THERAPY | Age: 49
Setting detail: THERAPIES SERIES
Discharge: HOME | End: 2024-09-05
Payer: COMMERCIAL

## 2024-09-05 PROCEDURE — 97112 NEUROMUSCULAR REEDUCATION: CPT

## 2024-09-05 PROCEDURE — 97110 THERAPEUTIC EXERCISES: CPT

## 2024-09-05 NOTE — FLOWSHEET NOTE
Perturbations 20\"X3   Wobble board   20\"x3  laterally  20\"x3  laterally    SLS on airex   BOSU  20\"x3   Cone taps  Airex 20x2 Airex 9in cone 10x2   BOSU marches    20x2   Lateral step overs    BOSU 10x2    BOSU squats    10x                     MODALITIES                      Other Therapeutic Activities/Education: --      Home Exercise Program:  *HO issued, reviewed and discussed with patient. All questions answered. Pt agreed to comply.    Reviewed HEP and added SLR and bridging      Manual Treatments:      Modalities:  --      Communication with other providers:  POC sent 7/18/24      Assessment:   Pt tolerated all exercises well. Demo improved tolerance and increased ease for closed chain and balance stability. Able to complete dynamic balance without increase in pain despite uneven surface. Functionally tolerating work duties well. Progressing well. Will continue to progress as able. Anticipate placement in hold in 2 visits. Pt would continue to benefit from skilled therapy interventions to address remaining impairments, improve mobility and strength and progress toward goal completion while reducing risk for re-injury or further decline.     End pain: 0 /10      Plan for Next Session: Progress L LE strength, range of motion, balance, and return to work activities      Time In / Time Out:  1030- 1115    Timed Code/Total Treatment Minutes:  45/45'  = 20' te, 25' neuro     Next Progress Note due:  Eval on 7/18/24. Visit 10.      Plan of Care Interventions:  [x] Therapeutic Exercise  [x] Modalities:  [x] Therapeutic Activity     [] Ultrasound  [] Estim  [x] Gait Training      [] Cervical Traction [] Lumbar Traction  [x] Neuromuscular Re-education    [x] Cold/hotpack [] Iontophoresis   [x] Instruction in HEP      [x] Vasopneumatic   [] Dry Needling    [] Manual Therapy               [] Aquatic Therapy              Electronically signed by:  John Sam, PT, DPT, OCS     9/5/2024 6:51 AM

## 2024-09-12 ENCOUNTER — HOSPITAL ENCOUNTER (OUTPATIENT)
Dept: PHYSICAL THERAPY | Age: 49
Setting detail: THERAPIES SERIES
Discharge: HOME | End: 2024-09-12
Payer: COMMERCIAL

## 2024-09-12 PROCEDURE — 97110 THERAPEUTIC EXERCISES: CPT

## 2024-09-12 PROCEDURE — 97112 NEUROMUSCULAR REEDUCATION: CPT

## 2024-09-12 PROCEDURE — 97530 THERAPEUTIC ACTIVITIES: CPT

## 2024-09-19 ENCOUNTER — HOSPITAL ENCOUNTER (OUTPATIENT)
Dept: PHYSICAL THERAPY | Age: 49
Setting detail: THERAPIES SERIES
Discharge: HOME | End: 2024-09-19
Payer: COMMERCIAL

## 2024-09-19 PROCEDURE — 97110 THERAPEUTIC EXERCISES: CPT

## 2024-09-19 PROCEDURE — 97530 THERAPEUTIC ACTIVITIES: CPT

## 2025-07-09 RX ORDER — MAGNESIUM GLUCONATE 30 MG(550)
TABLET ORAL DAILY
COMMUNITY

## 2025-07-09 RX ORDER — NORETHINDRONE ACETATE AND ETHINYL ESTRADIOL, ETHINYL ESTRADIOL AND FERROUS FUMARATE 1MG-10(24)
1 KIT ORAL DAILY
COMMUNITY

## 2025-07-14 ENCOUNTER — TRANSCRIBE ORDERS (OUTPATIENT)
Dept: ADMINISTRATIVE | Age: 50
End: 2025-07-14

## 2025-07-14 DIAGNOSIS — Z13.6 ENCOUNTER FOR SCREENING FOR VASCULAR DISEASE: Primary | ICD-10-CM

## 2025-08-15 ENCOUNTER — HOSPITAL ENCOUNTER (OUTPATIENT)
Dept: CT IMAGING | Age: 50
Discharge: HOME | End: 2025-08-15
Payer: COMMERCIAL

## 2025-08-15 DIAGNOSIS — Z13.6 ENCOUNTER FOR SCREENING FOR VASCULAR DISEASE: ICD-10-CM

## 2025-08-15 PROCEDURE — 75571 CT HRT W/O DYE W/CA TEST: CPT

## (undated) DEVICE — MARKER SURG SKIN UTIL REGULAR/FINE 2 TIP W/ RUL AND 9 LBL

## (undated) DEVICE — DRAPE SHEET ULTRAGARD: Brand: MEDLINE

## (undated) DEVICE — COUNTER NDL 30 COUNT FOAM STRP SGL MAG

## (undated) DEVICE — ARTHROSCOPY PACK: Brand: MEDLINE INDUSTRIES, INC.

## (undated) DEVICE — GLOVE ORANGE PI 8   MSG9080

## (undated) DEVICE — AGGRESSIVE PLUS, ANGLED CUTTER: Brand: FORMULA

## (undated) DEVICE — SYRINGE MED 10ML LUERLOCK TIP W/O SFTY DISP

## (undated) DEVICE — BANDAGE COMPR M W6INXL10YD WHT BGE VELC E MTRX HK AND LOOP

## (undated) DEVICE — 4-PORT MANIFOLD: Brand: NEPTUNE 2

## (undated) DEVICE — SYRINGE MED 5ML STD CLR PLAS LUERLOCK TIP N CTRL DISP

## (undated) DEVICE — MAT ABSRB W28XL48IN C6L FLR ULT W POLY BK

## (undated) DEVICE — TOWEL,OR,DSP,ST,BLUE,STD,6/PK,12PK/CS: Brand: MEDLINE

## (undated) DEVICE — TUBING PMP L16FT MAIN DISP FOR AR-6400 AR-6475

## (undated) DEVICE — PADDING CAST W6INXL4YD COT LO LINTING WYTEX

## (undated) DEVICE — TUBING, SUCTION, 9/32" X 10', STRAIGHT: Brand: MEDLINE

## (undated) DEVICE — Z DISCONTINUED USE 2218423 SUTURE ETHILON SZ 3-0 L18IN NONABSORBABLE BLK FS-1 L24MM 3/8 663H

## (undated) DEVICE — SOLUTION IV IRRIG WATER 1000ML POUR BRL 2F7114

## (undated) DEVICE — Z INACTIVE NO ACTIVE SUPPLIER APPLICATOR MEDICATED 26 CC TINT HI-LITE ORNG STRL CHLORAPREP

## (undated) DEVICE — GLOVE SURG SZ 8 L12IN THK75MIL DK GRN LTX FREE

## (undated) DEVICE — PAD,ABDOMINAL,5"X9",ST,LF,25/BX: Brand: MEDLINE INDUSTRIES, INC.